# Patient Record
Sex: FEMALE | Race: BLACK OR AFRICAN AMERICAN | Employment: FULL TIME | URBAN - METROPOLITAN AREA
[De-identification: names, ages, dates, MRNs, and addresses within clinical notes are randomized per-mention and may not be internally consistent; named-entity substitution may affect disease eponyms.]

---

## 2018-03-20 ENCOUNTER — HOSPITAL ENCOUNTER (EMERGENCY)
Age: 23
Discharge: HOME | End: 2018-03-21
Attending: EMERGENCY MEDICINE | Admitting: EMERGENCY MEDICINE
Payer: COMMERCIAL

## 2018-03-20 VITALS
HEART RATE: 66 BPM | RESPIRATION RATE: 20 BRPM | OXYGEN SATURATION: 100 % | TEMPERATURE: 97.6 F | SYSTOLIC BLOOD PRESSURE: 137 MMHG | DIASTOLIC BLOOD PRESSURE: 88 MMHG

## 2018-03-20 DIAGNOSIS — S80.811A LEG ABRASION, RIGHT, INITIAL ENCOUNTER: ICD-10-CM

## 2018-03-20 DIAGNOSIS — S80.02XA CONTUSION OF LEFT KNEE, INITIAL ENCOUNTER: ICD-10-CM
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
## 2018-03-20 DIAGNOSIS — S16.1XXA STRAIN OF NECK MUSCLE, INITIAL ENCOUNTER: ICD-10-CM

## 2018-03-20 DIAGNOSIS — S39.012A STRAIN OF LUMBAR REGION, INITIAL ENCOUNTER: ICD-10-CM

## 2018-03-20 PROCEDURE — 99284 EMERGENCY DEPT VISIT MOD MDM: CPT

## 2018-03-20 RX ORDER — OXYCODONE AND ACETAMINOPHEN 5; 325 MG/1; MG/1
1 TABLET ORAL
Status: COMPLETED | OUTPATIENT
Start: 2018-03-20 | End: 2018-03-21

## 2018-03-20 RX ORDER — CYCLOBENZAPRINE HCL 10 MG
10 TABLET ORAL
Status: COMPLETED | OUTPATIENT
Start: 2018-03-20 | End: 2018-03-21

## 2018-03-21 ENCOUNTER — APPOINTMENT (OUTPATIENT)
Dept: GENERAL RADIOLOGY | Age: 23
End: 2018-03-21
Attending: PHYSICIAN ASSISTANT
Payer: COMMERCIAL

## 2018-03-21 LAB — HCG UR QL: NEGATIVE

## 2018-03-21 PROCEDURE — 72100 X-RAY EXAM L-S SPINE 2/3 VWS: CPT

## 2018-03-21 PROCEDURE — 72040 X-RAY EXAM NECK SPINE 2-3 VW: CPT

## 2018-03-21 PROCEDURE — 74011250637 HC RX REV CODE- 250/637: Performed by: PHYSICIAN ASSISTANT

## 2018-03-21 PROCEDURE — 73564 X-RAY EXAM KNEE 4 OR MORE: CPT

## 2018-03-21 PROCEDURE — 81025 URINE PREGNANCY TEST: CPT

## 2018-03-21 RX ADMIN — CYCLOBENZAPRINE HYDROCHLORIDE 10 MG: 10 TABLET, FILM COATED ORAL at 00:01

## 2018-03-21 RX ADMIN — OXYCODONE HYDROCHLORIDE AND ACETAMINOPHEN 1 TABLET: 5; 325 TABLET ORAL at 00:01
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
## 2018-03-21 NOTE — ED NOTES
The documentation for this period is being entered following the guidelines as defined in the [facility] policy by Steve Yu RN.
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
## 2018-03-27 ENCOUNTER — OFFICE VISIT (OUTPATIENT)
Dept: INTERNAL MEDICINE CLINIC | Age: 23
End: 2018-03-27

## 2018-03-27 VITALS
SYSTOLIC BLOOD PRESSURE: 120 MMHG | BODY MASS INDEX: 47.09 KG/M2 | OXYGEN SATURATION: 100 % | WEIGHT: 293 LBS | RESPIRATION RATE: 16 BRPM | DIASTOLIC BLOOD PRESSURE: 77 MMHG | HEART RATE: 59 BPM | TEMPERATURE: 97 F | HEIGHT: 66 IN
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
## 2018-03-27 DIAGNOSIS — N92.6 MISSED PERIOD: ICD-10-CM

## 2018-03-27 DIAGNOSIS — N92.6 MENSTRUAL PERIOD LATE: ICD-10-CM

## 2018-03-27 DIAGNOSIS — M54.9 OTHER ACUTE BACK PAIN: ICD-10-CM

## 2018-03-27 PROBLEM — E66.01 OBESITY, MORBID (HCC): Status: ACTIVE | Noted: 2018-03-27

## 2018-03-27 RX ORDER — OXYCODONE AND ACETAMINOPHEN 5; 325 MG/1; MG/1
TABLET ORAL
COMMUNITY

## 2018-03-27 RX ORDER — MELOXICAM 15 MG/1
15 TABLET ORAL DAILY
Qty: 20 TAB | Refills: 0 | Status: SHIPPED | OUTPATIENT
Start: 2018-03-27

## 2018-03-27 RX ORDER — CYCLOBENZAPRINE HCL 10 MG
TABLET ORAL
COMMUNITY
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
## 2018-03-27 NOTE — PATIENT INSTRUCTIONS

## 2018-03-28 ENCOUNTER — HOSPITAL ENCOUNTER (OUTPATIENT)
Dept: PHYSICAL THERAPY | Age: 23
Discharge: HOME | End: 2018-03-28
Payer: COMMERCIAL

## 2018-03-28 PROCEDURE — 97161 PT EVAL LOW COMPLEX 20 MIN: CPT

## 2018-03-28 PROCEDURE — 97014 ELECTRIC STIMULATION THERAPY: CPT

## 2018-03-28 PROCEDURE — 97110 THERAPEUTIC EXERCISES: CPT
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
## 2018-04-02 ENCOUNTER — HOSPITAL ENCOUNTER (OUTPATIENT)
Dept: PHYSICAL THERAPY | Age: 23
Discharge: HOME | End: 2018-04-02
Payer: COMMERCIAL

## 2018-04-02 PROCEDURE — 97014 ELECTRIC STIMULATION THERAPY: CPT

## 2018-04-02 PROCEDURE — 97140 MANUAL THERAPY 1/> REGIONS: CPT

## 2018-04-02 PROCEDURE — 97110 THERAPEUTIC EXERCISES: CPT

## 2018-04-02 NOTE — PROGRESS NOTES
PT DAILY TREATMENT NOTE     Patient Name: Paulie Hanson  Date:2018  : 1995  [x]  Patient  Verified  Payor: Arlin Wood / Plan: [place] [employer]IMA  CAPITATED PT / Product Type: Commerical /    In time: 10:55 am           Out time: 11:42 am  Total Treatment Time (min): 52  Visit #: 2 of     Treatment Area: Acute back pain [M54.9]    SUBJECTIVE  Pain Level (0-10 scale): 7 in L/S, 4 in (L) knee  Any medication changes, allergies to medications, adverse drug reactions, diagnosis change, or new procedure performed?: [x] No    [] Yes (see summary sheet for update)  Subjective functional status/changes:   [] No changes reported  \"I have been feeling a bit better since I have been doing the exercises before I go to bed daily. I am still working, but lights things though. \"     OBJECTIVE  Modality rationale: decrease inflammation, decrease pain and increase tissue extensibility to improve the patients ability to tolerate prolonged amb   Min Type Additional Details   10 [x] Estim: []Att   [x]Unatt        []TENS instruct                  [x]IFC  []Premod   []NMES                     []Other:  []w/US   []w/ice   [x]w/heat  Position: semi-reclined  Location: L/S    []  Traction: [] Cervical       []Lumbar                       [] Prone          []Supine                       []Intermittent   []Continuous Lbs:  [] before manual  [] after manual    []  Ultrasound: []Continuous   [] Pulsed                           []1MHz   []3MHz Location:  W/cm2:    []  Iontophoresis with dexamethasone         Location: [] Take home patch   [] In clinic    []  Ice     []  heat  []  Ice massage Position:  Location:    []  Vasopneumatic Device Pressure:       [] lo [] med [] hi   Temperature: [] lo [] med [] hi   [x] Skin assessment post-treatment:  [x]intact []redness- no adverse reaction       []redness - adverse reaction:     29 min Therapeutic Exercise:  [x] See flow sheet: initiated therex per IE   Rationale: increase ROM, increase strength and improve coordination to improve the patients ability to perform work duties     8 min Manual Therapy:  prone rolling to (B) LPS and gluts   Rationale: decrease pain, increase ROM, increase tissue extensibility and decrease trigger points to tolerate prolonged sitting, standing, and walking as necessitated by work duties          X min Patient Education: [x] Review HEP from IE     Other Objective/Functional Measures:    STG met. Good PPT - appropriate lower core awareness. Pain Level (0-10 scale) post treatment: 5 in L/S    ASSESSMENT/Changes in Function:   Good tolerance to treatment today with patient req 100% verbal/tactile cueing and demo for proper form/technique with all newly introduced therex; pt able to complete all therex without adverse reaction. Patient will continue to benefit from skilled PT services to modify and progress therapeutic interventions, address functional mobility deficits, address ROM deficits, address strength deficits, analyze and address soft tissue restrictions, analyze and cue movement patterns, analyze and modify body mechanics/ergonomics and assess and modify postural abnormalities to attain remaining goals. [x]  See Plan of Care  []  See progress note/recertification  []  See Discharge Summary         Progress towards goals / Updated goals:  Short Term Goals: To be accomplished in  1  weeks:  1. Pt will be independent and compliant with HEP. -Goal met; patient notes compliance 1x daily at night (4/2/18)  Long Term Goals: To be accomplished in  8-12  treatments:  1. Patient will increase FOTO score to 74/100 for indications of increased functional mobility. 2.  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 Patient will demo LS flexion to fingertips to ankle for ease with bending to ADLs and work duties   4.   Patient will report 50% improvement in sleep tolerance for progression to increased QOL and to PLOF     PLAN  [x]  Upgrade activities as tolerated     [x]  Continue plan of care  []  Update interventions per flow sheet       []  Discharge due to:_  [x]  Other: assess response to tx    Rangel Lee, PTA 4/2/2018

## 2018-04-04 ENCOUNTER — HOSPITAL ENCOUNTER (OUTPATIENT)
Dept: PHYSICAL THERAPY | Age: 23
Discharge: HOME | End: 2018-04-04
Payer: COMMERCIAL

## 2018-04-04 PROCEDURE — 97110 THERAPEUTIC EXERCISES: CPT

## 2018-04-04 PROCEDURE — 97140 MANUAL THERAPY 1/> REGIONS: CPT

## 2018-04-04 PROCEDURE — 97014 ELECTRIC STIMULATION THERAPY: CPT

## 2018-04-04 NOTE — PROGRESS NOTES
PT DAILY TREATMENT NOTE     Patient Name: Greg Jacobs  Date:2018  : 1995  [x]  Patient  Verified  Payor: Mary López / Plan: [place] [employer]IMA  [facility] Type: Commer[facility] /    In time: 11:32 am           Out time: 12:38 pm  Total Treatment Time (min): 66  Visit #: 3 of     Treatment Area: Acute back pain [M54.9]    SUBJECTIVE  Pain Level (0-10 scale): 7 in L/S, 4 in knee  Any medication changes, allergies to medications, adverse drug reactions, diagnosis change, or new procedure performed?: [x] No    [] Yes (see summary sheet for update)  Subjective functional status/changes:   [] No changes reported  \"I worked 8 hours today. \"    OBJECTIVE  Modality rationale: decrease inflammation, decrease pain and increase tissue extensibility to improve the patients ability to tolerate prolonged amb   Min Type Additional Details   10 [x] Estim: []Att   [x]Unatt        []TENS instruct                  [x]IFC  []Premod   []NMES                     []Other:  []w/US   []w/ice   [x]w/heat  Position: semi-reclined with LE elevated on wedge  Location: L/S    []  Traction: [] Cervical       []Lumbar                       [] Prone          []Supine                       []Intermittent   []Continuous Lbs:  [] before manual  [] after manual    []  Ultrasound: []Continuous   [] Pulsed                           []1MHz   []3MHz Location:  W/cm2:    []  Iontophoresis with dexamethasone         Location: [] Take home patch   [] In clinic    []  Ice     []  heat  []  Ice massage Position:  Location:    []  Vasopneumatic Device Pressure:       [] lo [] med [] hi   Temperature: [] lo [] med [] hi   [x] Skin assessment post-treatment:  [x]intact []redness- no adverse reaction       []redness - adverse reaction:     48 min Therapeutic Exercise:  [x] See flow sheet: added SB LTR and cat/camels   Rationale: increase ROM, increase strength and improve coordination to improve the patients ability to perform work duties     8 min Manual Therapy:  prone rolling to (B) LPS and gluts; TPR to LPS   Rationale: decrease pain, increase ROM, increase tissue extensibility and decrease trigger points to tolerate prolonged sitting, standing, and walking as necessitated by work duties          X min Patient Education: [x] Review HEP     Other Objective/Functional Measures:     Pain Level (0-10 scale) post treatment: 6 in L/S, 4 in knee    ASSESSMENT/Changes in Function:   Significant posterior chain tissue tightness addressed well with manual interventions. Patient demos good PPT/TA draw, but challenged with appendicular loading. Patient will continue to benefit from skilled PT services to modify and progress therapeutic interventions, address functional mobility deficits, address ROM deficits, address strength deficits, analyze and address soft tissue restrictions, analyze and cue movement patterns, analyze and modify body mechanics/ergonomics and assess and modify postural abnormalities to attain remaining goals. [x]  See Plan of Care  []  See progress note/recertification  []  See Discharge Summary         Progress towards goals / Updated goals:  Short Term Goals: To be accomplished in  1  weeks:  1. Pt will be independent and compliant with HEP. -Goal met; patient notes compliance 1x daily at night (4/2/18)  Long Term Goals: To be accomplished in  8-12  treatments:  1. Patient will increase FOTO score to 74/100 for indications of increased functional mobility. 2.  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 Patient will demo LS flexion to fingertips to ankle for ease with bending to ADLs and work duties   4.   Patient will report 50% improvement in sleep tolerance for progression to increased QOL and to PLOF     PLAN  [x]  Upgrade activities as tolerated     [x]  Continue plan of care  []  Update interventions per flow sheet       []  Discharge due to:_  [] Other:_    Marry Sosa, PTA 4/4/2018

## 2018-04-05 ENCOUNTER — OFFICE VISIT (OUTPATIENT)
Dept: INTERNAL MEDICINE CLINIC | Age: 23
End: 2018-04-05

## 2018-04-05 VITALS
OXYGEN SATURATION: 99 % | RESPIRATION RATE: 16 BRPM | HEIGHT: 66 IN | SYSTOLIC BLOOD PRESSURE: 117 MMHG | WEIGHT: 293 LBS | TEMPERATURE: 96.8 F | DIASTOLIC BLOOD PRESSURE: 80 MMHG | BODY MASS INDEX: 47.09 KG/M2 | HEART RATE: 60 BPM

## 2018-04-05 DIAGNOSIS — M54.9 OTHER ACUTE BACK PAIN: Primary | ICD-10-CM

## 2018-04-05 NOTE — MR AVS SNAPSHOT
Mindy Reilly 
 
 
 [address] [facility] [address] [address] 
[phone] Patient: Hiram Szymanski MRN: [id number] :1995 Visit Information Date & Time Provider Department Dept. Phone Encounter #  
 2018  9:15 AM Antonio Lee NP [employer] [phone] [id number] Follow-up Instructions Return if symptoms worsen or fail to improve. Upcoming Health Maintenance Date Due  
 PAP AKA CERVICAL CYTOLOGY 2015 DTaP/Tdap/Td series (2 - Td) 12/15/2016 Influenza Age 5 to Adult 2017 Allergies as of 2018  Review Complete On: 2018 By: Jt Ibarra No Known Allergies Current Immunizations  Reviewed on 3/26/2018 Name Date HPV 2010, 2009, 2009 Hep B Vaccine 1995, 1995, 1995 MMR 2000, 1995 Tdap 12/15/2006 Not reviewed this visit Vitals BP Pulse Temp Resp Height(growth percentile) Weight(growth percentile) 117/80 (BP 1 Location: Right arm, BP Patient Position: Sitting) 60 96.8 °F (36 °C) (Oral) 16 5' 6\" (1.676 m) 297 lb (134.7 kg) SpO2 BMI Smoking Status 99% 47.94 kg/m2 Never Smoker Vitals History BMI and BSA Data Body Mass Index Body Surface Area  
 47.94 kg/m 2 2.5 m 2 Preferred Pharmacy Pharmacy Name Phone [id number], [address] [place] [address] Course [phone] Your Updated Medication List  
  
   
This list is accurate as of 18 10:04 AM.  Always use your most recent med list.  
  
  
  
  
 cyclobenzaprine 10 mg tablet Commonly known as:  FLEXERIL Take  by mouth three (3) times daily as needed for Muscle Spasm(s). meloxicam 15 mg tablet Commonly known as:  MOBIC Take 1 Tab by mouth daily. PERCOCET 5-325 mg per tablet Generic drug:  oxyCODONE-acetaminophen Take  by mouth every four (4) hours as needed for Pain. Follow-up Instructions Return if symptoms worsen or fail to improve. To-Do List   
 04/09/2018 11:00 AM  
  Appointment with Reg Healy, PT at [facility] [address] [place] ([phone]) 04/11/2018 11:30 AM  
  Appointment with Kane Crane, PT at [facility] [address] [place] ([phone]) 04/16/2018 11:00 AM  
  Appointment with Reg Healy, PT at [facility] [place] ([phone]) 04/18/2018 12:00 PM  
  Appointment with [facility] [address] at [facility] [place] ([phone])  
  
 04/23/2018 12:00 PM  
  Appointment with [address] [facility] 1 at [facility] [place] ([phone])  
  
 04/25/2018 12:30 PM  
  Appointment with [facility] [place] [facility] ([phone]) Patient Instructions Back Stretches: Exercises Your Care Instructions Here are some examples of exercises for stretching your back. Start each exercise slowly. Ease off the exercise if you start to have pain. Your doctor or physical therapist will tell you when you can start these exercises and which ones will work best for you. How to do the exercises Overhead stretch 1. Stand comfortably with your feet shoulder-width apart. 2. Looking straight ahead, raise both arms over your head and reach toward the ceiling. Do not allow your head to tilt back. 3. Hold for 15 to 30 seconds, then lower your arms to your sides. 4. Repeat 2 to 4 times. Side stretch 1. Stand comfortably with your feet shoulder-width apart. 2. Raise one arm over your head, and then lean to the other side. 3. Slide your hand down your leg as you let the weight of your arm gently stretch your side muscles. Hold for 15 to 30 seconds. 4. Repeat 2 to 4 times on each side. Press-up 1. Lie on your stomach, supporting your body with your forearms. 2. Press your elbows down into the floor to raise your upper back.  As you do this, relax your stomach muscles and allow your back to arch without using your back muscles. As your press up, do not let your hips or pelvis come off the floor. 3. Hold for 15 to 30 seconds, then relax. 4. Repeat 2 to 4 times. Relax and rest 
 
1. Lie on your back with a rolled towel under your neck and a pillow under your knees. Extend your arms comfortably to your sides. 2. Relax and breathe normally. 3. Remain in this position for about 10 minutes. 4. If you can, do this 2 or 3 times each day. Follow-up care is a key part of your treatment and safety. Be sure to make and go to all appointments, and call your doctor if you are having problems. It's also a good idea to know your test results and keep a list of the medicines you take. Where can you learn more? Go to http://dot-florence.info/. Enter O014 in the search box to learn more about \"Back Stretches: Exercises. \" Current as of: March 21, 2017 Content Version: 11.4 © 2760-5328 [employer]. Care instructions adapted under license by [employer] (which disclaims liability or warranty for this information). If you have questions about a medical condition or this instruction, always ask your healthcare professional. [place] [address] any warranty or liability for your use of this information. Introducing [facility]! Davey Greene introduces [employer] patient portal. Now you can access parts of your medical record, email your doctor's office, and request medication refills online. 1. In your internet browser, go to [email or web address] 2. Click on the First Time User? Click Here link in the Sign In box. You will see the New Member Sign Up page. 3. Enter your [employer] Access Code exactly as it appears below. You will not need to use this code after youve completed the sign-up process. If you do not sign up before the expiration date, you must request a new code.  
 
· [employer] Access Code: [email or web address] 
 Expires: 6/25/2018  3:37 PM 
 
4. Enter the last four digits of your Social Security Number (xxxx) and Date of Birth (mm/dd/yyyy) as indicated and click Submit. You will be taken to the next sign-up page. 5. Create a [employer] ID. This will be your [employer] login ID and cannot be changed, so think of one that is secure and easy to remember. 6. Create a [employer] password. You can change your password at any time. 7. Enter your Password Reset Question and Answer. This can be used at a later time if you forget your password. 8. Enter your e-mail address. You will receive e-mail notification when new information is available in [address] 9. Click Sign Up. You can now view and download portions of your medical record. 10. Click the Download Summary menu link to download a portable copy of your medical information. If you have questions, please visit the Frequently Asked Questions section of the [employer] website. Remember, [employer] is NOT to be used for urgent needs. For medical emergencies, dial 911. Now available from your iPhone and Android! Please provide this summary of care documentation to your next provider. Your primary care clinician is listed as LOENA JUAREZ. If you have any questions after today's visit, please call [phone].

## 2018-04-05 NOTE — PROGRESS NOTES
HISTORY OF PRESENT ILLNESS  Carlie Anaya is a 20 y.o. female. Patient presents for lower back follow up. Rates pain as 5/10. Patient has had three sessions of PT, states pain is gradually improving. Patient denies any dysuria, urinary frequency, hematuria, difficulty with ambulation,defecation, numbness,tingling,pain radiation to lower extremity. LOW BACK PAIN   The history is provided by the patient. This is a new problem. The current episode started more than 1 week ago. The problem occurs constantly. The problem has been gradually improving. Pertinent negatives include no chest pain, no abdominal pain, no headaches and no shortness of breath. Review of Systems   Constitutional: Negative. HENT: Negative. Eyes: Negative. Respiratory: Negative. Negative for shortness of breath. Cardiovascular: Negative. Negative for chest pain. Gastrointestinal: Negative. Negative for abdominal pain. Genitourinary: Negative. Musculoskeletal: Positive for back pain. Good ROM to back, good lateral movements,able to bend over,tenderness to lower back, no evidence of deformity,swelling,gait steady. Skin: Negative. Neurological: Negative. Negative for headaches. Psychiatric/Behavioral: Negative. Physical Exam   Constitutional: She is oriented to person, place, and time. She appears well-developed and well-nourished. /80 (BP 1 Location: Right arm, BP Patient Position: Sitting)  Pulse 60  Temp 96.8 °F (36 °C) (Oral)   Resp 16  Ht 5' 6\" (1.676 m)  Wt 297 lb (134.7 kg)  SpO2 99%  BMI 47.94 kg/m2     HENT:   Head: Normocephalic and atraumatic. Eyes: Conjunctivae and EOM are normal. Pupils are equal, round, and reactive to light. Neck: Normal range of motion. Cardiovascular: Normal rate. Pulmonary/Chest: Effort normal and breath sounds normal.   Musculoskeletal: Normal range of motion. Lumbar back: She exhibits tenderness.    Neurological: She is alert and oriented to person, place, and time. GCS eye subscore is 4. GCS verbal subscore is 5. GCS motor subscore is 6. Skin: Skin is warm and dry. Psychiatric: She has a normal mood and affect. Her speech is normal and behavior is normal. Judgment and thought content normal. Cognition and memory are normal.   Vitals reviewed. ASSESSMENT and PLAN    ICD-10-CM ICD-9-CM    1. Other acute back pain M54.9 724.5      Encounter Diagnoses   Name Primary?  Other acute back pain Yes     No orders of the defined types were placed in this encounter. No orders of the defined types were placed in this encounter. No orders of the defined types were placed in this encounter. Diagnoses and all orders for this visit:    1. Other acute back pain      Follow-up Disposition:  Return if symptoms worsen or fail to improve.   current treatment plan is effective, no change in therapy  the following changes in treatment are made: Patient still has NSAID and muscle relaxant, Continue current Rx, reassess after PT.

## 2018-04-05 NOTE — LETTER
NOTIFICATION RETURN TO WORK / SCHOOL 
 
4/5/2018 10:01 AM 
 
Ms. Joao Finch [address] [place] [id number] To Whom It May Concern: 
 
Joao Finch is currently under the care of Cherry Bangura. She will return to work/school on: No push,pull or lift more than 10 lbs until revaluated on the 4/12/2018 If there are questions or concerns please have the patient contact our office. Sincerely, Ana Paula Mars NP

## 2018-04-05 NOTE — PROGRESS NOTES
ROOM # 3    Bill Gonzalez presents today for   Chief Complaint   Patient presents with    LOW BACK PAIN       Bill Gonzalez preferred language for health care discussion is english/other. Is someone accompanying this pt? no    Is the patient using any DME equipment during OV? no    Depression Screening:  PHQ over the last two weeks 3/27/2018   Little interest or pleasure in doing things Not at all   Feeling down, depressed or hopeless Not at all   Total Score PHQ 2 0       Learning Assessment:  Learning Assessment 3/27/2018   PRIMARY LEARNER Patient   HIGHEST LEVEL OF EDUCATION - PRIMARY LEARNER  SOME COLLEGE   BARRIERS PRIMARY LEARNER NONE   CO-LEARNER CAREGIVER No   PRIMARY LANGUAGE ENGLISH   LEARNER PREFERENCE PRIMARY READING   ANSWERED BY patient    RELATIONSHIP SELF       Abuse Screening:  n/i    Fall Risk  n/i    Health Maintenance reviewed and discussed per provider. Yes    Bill Gonzalez is due for nothing at this time. Please order/place referral if appropriate. Advance Directive:  1. Do you have an advance directive in place? Patient Reply: no    2. If not, would you like material regarding how to put one in place? Patient Reply: no    Coordination of Care:  1. Have you been to the ER, urgent care clinic since your last visit? Hospitalized since your last visit? no    2. Have you seen or consulted any other health care providers outside of the [facility] since your last visit? Include any pap smears or colon screening.  no

## 2018-04-05 NOTE — PATIENT INSTRUCTIONS

## 2018-04-05 NOTE — LETTER
NOTIFICATION RETURN TO WORK / SCHOOL 
 
4/5/2018 9:58 AM 
 
Ms. Delilah Hobbs [address] [place] [phone] To Whom It May Concern: 
 
Delilah Hobbs is currently under the care of Cherry Banguar. She may be excused from work until she reevaluated on the 4/12/2018 If there are questions or concerns please have the patient contact our office. Sincerely, Darwin Ortega NP

## 2018-04-05 NOTE — PROGRESS NOTES
Patient presents for lower back follow up. Rates pain as 5/10. Patient has had three sessions of PT, states pain is gradually improving. Patient denies any dysuria, urinary frequency, hematuria, difficulty with ambulation,defecation, numbness,tingling,pain radiation to lower extremity.

## 2018-04-09 ENCOUNTER — HOSPITAL ENCOUNTER (OUTPATIENT)
Dept: PHYSICAL THERAPY | Age: 23
Discharge: HOME | End: 2018-04-09
Payer: COMMERCIAL

## 2018-04-09 PROCEDURE — 97014 ELECTRIC STIMULATION THERAPY: CPT

## 2018-04-09 PROCEDURE — 97140 MANUAL THERAPY 1/> REGIONS: CPT

## 2018-04-09 PROCEDURE — 97110 THERAPEUTIC EXERCISES: CPT

## 2018-04-09 NOTE — PROGRESS NOTES
PT DAILY TREATMENT NOTE     Patient Name: Joao Finch  Date:2018  : 1995  [x]  Patient  Verified  Payor: Mohit Olmos / Plan: [place] [employer]  CAPITATED PT / Product Type: Commerical /    In time: 11:03 am           Out time: 12:17 pm  Total Treatment Time (min): 74  Visit #: 4 of     Treatment Area: Acute back pain [M54.9]    SUBJECTIVE  Pain Level (0-10 scale): 4-5 in L/S  Any medication changes, allergies to medications, adverse drug reactions, diagnosis change, or new procedure performed?: [] No    [x] Yes (see summary sheet for update)  Subjective functional status/changes:   [] No changes reported  [facility] doctor gave me Meloxicam and it has been helping. 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 \"    OBJECTIVE  Modality rationale: decrease inflammation, decrease pain and increase tissue extensibility to improve the patients ability to tolerate prolonged amb   Min Type Additional Details   10 [x] Estim: []Att   [x]Unatt        []TENS instruct                  [x]IFC  []Premod   []NMES                     []Other:  []w/US   []w/ice   [x]w/heat  Position: semi-reclined with LE elevated on wedge  Location: L/S    []  Traction: [] Cervical       []Lumbar                       [] Prone          []Supine                       []Intermittent   []Continuous Lbs:  [] before manual  [] after manual    []  Ultrasound: []Continuous   [] Pulsed                           []1MHz   []3MHz Location:  W/cm2:    []  Iontophoresis with dexamethasone         Location: [] Take home patch   [] In clinic    []  Ice     []  heat  []  Ice massage Position:  Location:    []  Vasopneumatic Device Pressure:       [] lo [] med [] hi   Temperature: [] lo [] med [] hi   [x] Skin assessment post-treatment:  [x]intact []redness- no adverse reaction       []redness - adverse reaction:     55 min Therapeutic Exercise:  [x] See flow sheet: added SB flexion stretch 3-way and bridges   Rationale: increase ROM, increase strength and improve coordination to improve the patients ability to perform work duties     9 min Manual Therapy: prone rolling to (B) LPS and gluts; TPR to LPS   Rationale: decrease pain, increase ROM, increase tissue extensibility and decrease trigger points to tolerate prolonged sitting, standing, and walking as necessitated by work duties          X min Patient Education: [x] Review HEP - added LS flexion stretch     Other Objective/Functional Measures:    Core strength: 100% bridge with fatigue   Sleep interruptions: nightly    Pain Level (0-10 scale) post treatment: 3 in L/S    ASSESSMENT/Changes in Function:   Good tolerance to treatment today. Patient demos good form/mechanics with crate lift, but req VCs for TA draw to maintain neutral spine; c/o tightness in the low back with full squat position to lift crate. Patient will continue to benefit from skilled PT services to modify and progress therapeutic interventions, address functional mobility deficits, address ROM deficits, address strength deficits, analyze and address soft tissue restrictions, analyze and cue movement patterns, analyze and modify body mechanics/ergonomics and assess and modify postural abnormalities to attain remaining goals. [x]  See Plan of Care  []  See progress note/recertification  []  See Discharge Summary         Progress towards goals / Updated goals:  Short Term Goals: To be accomplished in  1  weeks:  1. Pt will be independent and compliant with HEP. -Goal met; patient notes compliance 1x daily at night (4/2/18)  Long Term Goals: To be accomplished in  8-12  treatments:  1. Patient will increase FOTO score to 74/100 for indications of increased functional mobility. 2.  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 -Goal met; pt demos 100% bridge (4/9/18)  3. Patient will demo LS flexion to fingertips to ankle for ease with bending to ADLs and work duties.   4.  Patient will report 50% improvement in sleep tolerance for progression to increased QOL and to PLOF.      PLAN  [x]  Upgrade activities as tolerated     [x]  Continue plan of care  []  Update interventions per flow sheet       []  Discharge due to:_  []  Other:_    Marry Sosa, PTA 4/9/2018

## 2018-04-11 ENCOUNTER — APPOINTMENT (OUTPATIENT)
Dept: PHYSICAL THERAPY | Age: 23
End: 2018-04-11
Payer: COMMERCIAL

## 2018-04-12 ENCOUNTER — OFFICE VISIT (OUTPATIENT)
Dept: INTERNAL MEDICINE CLINIC | Age: 23
End: 2018-04-12

## 2018-04-12 VITALS
SYSTOLIC BLOOD PRESSURE: 132 MMHG | WEIGHT: 293 LBS | OXYGEN SATURATION: 99 % | HEIGHT: 66 IN | BODY MASS INDEX: 47.09 KG/M2 | TEMPERATURE: 97.1 F | DIASTOLIC BLOOD PRESSURE: 72 MMHG | HEART RATE: 73 BPM | RESPIRATION RATE: 16 BRPM

## 2018-04-12 DIAGNOSIS — S39.92XS INJURY OF LOW BACK, SEQUELA: ICD-10-CM
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
## 2018-04-12 NOTE — PROGRESS NOTES
Patient presents for a a lower back pain f/u, rates pain as 4/10. Patient has been going to PT, verbalizes improving with PT. Patient denies any numbness,tingling, pain radiation to lower extremity, dysuria, urinary frequency, abd pain.

## 2018-04-12 NOTE — PATIENT INSTRUCTIONS

## 2018-04-12 NOTE — LETTER
NOTIFICATION RETURN TO WORK / SCHOOL 
 
4/12/2018 9:26 AM 
 
Ms. Reinhold Sandhoff [address] [place] [phone] To Whom It May Concern: 
 
Reinhold Sandhoff is currently under the care of Cherry Bangura. She will return to work/school on: 4/12/2018 with full and unrestricted duty. If there are questions or concerns please have the patient contact our office. Sincerely, Jeet Mckeon NP

## 2018-04-12 NOTE — PROGRESS NOTES
ROOM # 8    My Mendez presents today for   Chief Complaint   Patient presents with    LOW BACK PAIN     f/u        My Mendez preferred language for health care discussion is english/other. Is someone accompanying this pt? no    Is the patient using any DME equipment during OV? no    Depression Screening:  PHQ over the last two weeks 3/27/2018   Little interest or pleasure in doing things Not at all   Feeling down, depressed or hopeless Not at all   Total Score PHQ 2 0       Learning Assessment:  Learning Assessment 3/27/2018   PRIMARY LEARNER Patient   HIGHEST LEVEL OF EDUCATION - PRIMARY LEARNER  SOME COLLEGE   BARRIERS PRIMARY LEARNER NONE   CO-LEARNER CAREGIVER No   PRIMARY LANGUAGE ENGLISH   LEARNER PREFERENCE PRIMARY READING   ANSWERED BY patient    RELATIONSHIP SELF       Abuse Screening:  n/i    Fall Risk  n/i    Health Maintenance reviewed and discussed per provider. Yes    My Mendez is due for nothing at this time. Please order/place referral if appropriate. Advance Directive:  1. Do you have an advance directive in place? Patient Reply: no    2. If not, would you like material regarding how to put one in place? Patient Reply: no    Coordination of Care:  1. Have you been to the ER, urgent care clinic since your last visit? Hospitalized since your last visit? no    2. Have you seen or consulted any other health care providers outside of the [address] [place] [address] since your last visit? Include any pap smears or colon screening.  no

## 2018-04-13 ENCOUNTER — HOSPITAL ENCOUNTER (OUTPATIENT)
Dept: PHYSICAL THERAPY | Age: 23
Discharge: HOME | End: 2018-04-13
Payer: COMMERCIAL

## 2018-04-13 PROCEDURE — 97110 THERAPEUTIC EXERCISES: CPT

## 2018-04-13 NOTE — PROGRESS NOTES
PT DAILY TREATMENT NOTE     Patient Name: Erik Espinoza  Date:2018  : 1995  [x]  Patient  Verified  Payor: Pacheco Bacon / Plan: [place] [employer]  CAPITATED PT / Product Type: Commerical /    In time: 2:03 pm           Out time: 2:45 pm  Total Treatment Time (min): 42  Visit #: 5 of      Treatment Area: Acute back pain [M54.9]    SUBJECTIVE  Pain Level (0-10 scale): 3 in L/S  Any medication changes, allergies to medications, adverse drug reactions, diagnosis change, or new procedure performed?: [] No    [x] Yes (see summary sheet for update)  Subjective functional status/changes:   [] No changes reported  \"I have been walking on the treadmill at my work's gym. I feel pretty good today. \"    OBJECTIVE  Modality rationale: decrease inflammation, decrease pain and increase tissue extensibility to improve the patients ability to tolerate prolonged amb   Min Type Additional Details   PD [x] Estim: []Att   [x]Unatt        []TENS instruct                  [x]IFC  []Premod   []NMES                     []Other:  []w/US   []w/ice   [x]w/heat  Position: semi-reclined with LE elevated on wedge  Location: L/S    []  Traction: [] Cervical       []Lumbar                       [] Prone          []Supine                       []Intermittent   []Continuous Lbs:  [] before manual  [] after manual    []  Ultrasound: []Continuous   [] Pulsed                           []1MHz   []3MHz Location:  W/cm2:    []  Iontophoresis with dexamethasone         Location: [] Take home patch   [] In clinic    []  Ice     []  heat  []  Ice massage Position:  Location:    []  Vasopneumatic Device Pressure:       [] lo [] med [] hi   Temperature: [] lo [] med [] hi   [x] Skin assessment post-treatment:  [x]intact []redness- no adverse reaction       []redness - adverse reaction:     42 min Therapeutic Exercise:  [x] See flow sheet: add TB monster walks, TB sidestepping, TB chops bilaterally and minisquat   Rationale: increase ROM, increase strength and improve coordination to improve the patients ability to perform work duties     0 min Manual Therapy: NI   Rationale: decrease pain, increase ROM, increase tissue extensibility and decrease trigger points to tolerate prolonged sitting, standing, and walking as necessitated by work duties          X min Patient Education: [x] Review HEP - added resisted monster walks and sidestepping (TB provided)     Other Objective/Functional Measures:    LS flexion AROM: fingertips to floor    Pain Level (0-10 scale) post treatment: 3 in L/S    ASSESSMENT/Changes in Function:   Good tolerance to progression of therex today with pt req only min VCs for TA draw and PPT to avoid LS substitutions; pt notes ability to perform all therex without adverse reaction. Good squat mechanics. Held manual and IFC as PD due to improved sx today. Patient will continue to benefit from skilled PT services to modify and progress therapeutic interventions, address functional mobility deficits, address ROM deficits, address strength deficits, analyze and address soft tissue restrictions, analyze and cue movement patterns, analyze and modify body mechanics/ergonomics and assess and modify postural abnormalities to attain remaining goals. [x]  See Plan of Care  []  See progress note/recertification  []  See Discharge Summary         Progress towards goals / Updated goals:  Short Term Goals: To be accomplished in  1  weeks:  1. Pt will be independent and compliant with HEP. -Goal met; patient notes compliance 1x daily at night (4/2/18)  Long Term Goals: To be accomplished in  8-12  treatments:  1. Patient will increase FOTO score to 74/100 for indications of increased functional mobility. -Goal progressing; pt has returned to work gym for TM walking to improve cardiovascular endurance (4/13/18)  2. 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  -Goal met; pt demos 100% bridge (4/9/18)  3. Patient will demo LS flexion to fingertips to ankle for ease with bending to ADLs and work duties. -Goal met; pt demos AROM LS flexion fingertips to floor (4/13/18)  4. Patient will report 50% improvement in sleep tolerance for progression to increased QOL and to PLOF.      PLAN  [x]  Upgrade activities as tolerated     [x]  Continue plan of care  []  Update interventions per flow sheet       []  Discharge due to:_  [x]  Other: assess response to progressed therex    Leslie Antunez, PTA 4/13/2018

## 2018-04-16 ENCOUNTER — HOSPITAL ENCOUNTER (OUTPATIENT)
Dept: PHYSICAL THERAPY | Age: 23
Discharge: HOME | End: 2018-04-16
Payer: COMMERCIAL

## 2018-04-16 PROCEDURE — 97140 MANUAL THERAPY 1/> REGIONS: CPT

## 2018-04-16 PROCEDURE — 97014 ELECTRIC STIMULATION THERAPY: CPT

## 2018-04-16 PROCEDURE — 97110 THERAPEUTIC EXERCISES: CPT

## 2018-04-16 NOTE — PROGRESS NOTES
PT DAILY TREATMENT NOTE     Patient Name: Bill Gonzalez  Date:2018  : 1995  [x]  Patient  Verified  Payor: Fabiola Clinton / Plan: [place] [employer]  CAPITATED PT / Product Type: Commerical /    In time: 11:02 am         Out time: 12:12 pm  Total Treatment Time (min): 70  Visit #: 6 of      Treatment Area: Acute back pain [M54.9]    SUBJECTIVE  Pain Level (0-10 scale): 6 in L/S, 0 in knee  Any medication changes, allergies to medications, adverse drug reactions, diagnosis change, or new procedure performed?: [] No    [x] Yes (see summary sheet for update)  Subjective functional status/changes:   [] No changes reported  \"Just a little tight today. I was doing a lot of lifting at work the other day. \"    OBJECTIVE  Modality rationale: decrease inflammation, decrease pain and increase tissue extensibility to improve the patients ability to tolerate prolonged amb   Min Type Additional Details   10 [x] Estim: []Att   [x]Unatt        []TENS instruct                  [x]IFC  []Premod   []NMES                     []Other:  []w/US   []w/ice   [x]w/heat  Position: semi-reclined with LE elevated on wedge  Location: L/S    []  Traction: [] Cervical       []Lumbar                       [] Prone          []Supine                       []Intermittent   []Continuous Lbs:  [] before manual  [] after manual    []  Ultrasound: []Continuous   [] Pulsed                           []1MHz   []3MHz Location:  W/cm2:    []  Iontophoresis with dexamethasone         Location: [] Take home patch   [] In clinic    []  Ice     []  heat  []  Ice massage Position:  Location:    []  Vasopneumatic Device Pressure:       [] lo [] med [] hi   Temperature: [] lo [] med [] hi   [x] Skin assessment post-treatment:  [x]intact []redness- no adverse reaction       []redness - adverse reaction:     51 min Therapeutic Exercise:  [x] See flow sheet: added open books   Rationale: increase ROM, increase strength and improve coordination to improve the patients ability to perform work duties     9 min Manual Therapy: prone stick rolling to (B) LPS and QL, TPS   Rationale: decrease pain, increase ROM, increase tissue extensibility and decrease trigger points to tolerate prolonged sitting, standing, and walking as necessitated by work duties          X min Patient Education: [x] Review HEP     Other Objective/Functional Measures:       Pain Level (0-10 scale) post treatment: 5    ASSESSMENT/Changes in Function:   Good tolerance to progression of therex today. Patient demos significant reduction in T/S extension with > chunk of extension from the L3-5 junction. Patient able to complete all therex without visible distress. Patient will continue to benefit from skilled PT services to modify and progress therapeutic interventions, address functional mobility deficits, address ROM deficits, address strength deficits, analyze and address soft tissue restrictions, analyze and cue movement patterns, analyze and modify body mechanics/ergonomics and assess and modify postural abnormalities to attain remaining goals. [x]  See Plan of Care  []  See progress note/recertification  []  See Discharge Summary         Progress towards goals / Updated goals:  Short Term Goals: To be accomplished in  1  weeks:  1. Pt will be independent and compliant with HEP. -Goal met; patient notes compliance 1x daily at night (4/2/18)  Long Term Goals: To be accomplished in  8-12  treatments:  1. Patient will increase FOTO score to 74/100 for indications of increased functional mobility. -Goal progressing; pt has returned to work gym for TM walking to improve cardiovascular endurance (4/13/18)  2. 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 -Goal met; pt demos 100% bridge (4/9/18)  3. Patient will demo LS flexion to fingertips to ankle for ease with bending to ADLs and work duties.  -Goal met; pt demos AROM LS flexion fingertips to floor (4/13/18)  4. Patient will report 50% improvement in sleep tolerance for progression to increased QOL and to PLOF.      PLAN  [x]  Upgrade activities as tolerated     [x]  Continue plan of care  []  Update interventions per flow sheet       []  Discharge due to:_  [x]  Other: assess response to progressed therex    Bryanna Phillips, FRIDA 4/16/2018

## 2018-04-18 ENCOUNTER — HOSPITAL ENCOUNTER (OUTPATIENT)
Dept: PHYSICAL THERAPY | Age: 23
Discharge: HOME | End: 2018-04-18
Payer: COMMERCIAL

## 2018-04-18 PROCEDURE — 97140 MANUAL THERAPY 1/> REGIONS: CPT

## 2018-04-18 PROCEDURE — 97530 THERAPEUTIC ACTIVITIES: CPT

## 2018-04-18 PROCEDURE — 97110 THERAPEUTIC EXERCISES: CPT

## 2018-04-18 PROCEDURE — 97014 ELECTRIC STIMULATION THERAPY: CPT

## 2018-04-18 NOTE — PROGRESS NOTES
PT DAILY TREATMENT NOTE     Patient Name: Kinsey Cesar  Date:2018  : 1995  [x]  Patient  Verified  Payor: Teresa Gabriel / Plan: [place] [employer]  CAPITATED PT / Product Type: Commerical /    In time:          Out time: 103  Total Treatment Time (min): 60  Visit #: 7 of      Treatment Area: Acute back pain [M54.9]    SUBJECTIVE  Pain Level (0-10 scale): 6 in L/S, 0 in knee  Any medication changes, allergies to medications, adverse drug reactions, diagnosis change, or new procedure performed?: [] No    [x] Yes (see summary sheet for update)  Subjective functional status/changes:   [] No changes reported  \"I joined the gym and have been walking on the TM and am happy about that. I do not have pain when walking on the TM on a 2.0 incline. \"    OBJECTIVE  Modality rationale: decrease inflammation, decrease pain and increase tissue extensibility to improve the patients ability to tolerate prolonged amb   Min Type Additional Details   10 [x] Estim: []Att   [x]Unatt        []TENS instruct                  [x]IFC  []Premod   []NMES                     []Other:  []w/US   []w/ice   [x]w/heat  Position: prone over pillow  Location: L/S    []  Traction: [] Cervical       []Lumbar                       [] Prone          []Supine                       []Intermittent   []Continuous Lbs:  [] before manual  [] after manual    []  Ultrasound: []Continuous   [] Pulsed                           []1MHz   []3MHz Location:  W/cm2:    []  Iontophoresis with dexamethasone         Location: [] Take home patch   [] In clinic    []  Ice     []  heat  []  Ice massage Position:  Location:    []  Vasopneumatic Device Pressure:       [] lo [] med [] hi   Temperature: [] lo [] med [] hi   [x] Skin assessment post-treatment:  [x]intact []redness- no adverse reaction       []redness - adverse reaction:     30 min Therapeutic Exercise:  [x] See flow sheet:   Rationale: increase ROM, increase strength and improve coordination to improve the patients ability to perform work duties     10 min Manual Therapy: prone stick rolling to bilateral lumbar paraspinals; PA glides thoracic spine grade III and IV to tolerance   Rationale: decrease pain, increase ROM, increase tissue extensibility and decrease trigger points to tolerate prolonged sitting, standing, and walking as necessitated by work duties    10min theracts: education in lifting mechanics and body 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 Discontinued third, secondary to c/o LBP and increased lumbar lordosis. Pt able to complete pot stirs and donkey kicks over swiss ball without pain with VCs for neutral spine. +Bilateral Han test and pt would continue to benefit from iliopsoas stretching and TPR to such. Stressed importance of using gait belt at work to lift pts and proper body mechanics in addition to recommending margret lift. Patient will continue to benefit from skilled PT services to modify and progress therapeutic interventions, address functional mobility deficits, address ROM deficits, address strength deficits, analyze and address soft tissue restrictions, analyze and cue movement patterns, analyze and modify body mechanics/ergonomics and assess and modify postural abnormalities to attain remaining goals. [x]  See Plan of Care  []  See progress note/recertification  []  See Discharge Summary         Progress towards goals / Updated goals:  Short Term Goals: To be accomplished in  1  weeks:  1. Pt will be independent and compliant with HEP.     -Goal met; patient notes compliance 1x daily at night (4/2/18)  Long Term Goals: To be accomplished in  8-12  treatments:  1. Patient will increase FOTO score to 74/100 for indications of increased functional mobility. -Goal progressing; pt has returned to work gym for TM walking to improve cardiovascular endurance (4/13/18)  2. 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 -Goal met; pt demos 100% bridge (4/9/18)  3. Patient will demo LS flexion to fingertips to ankle for ease with bending to ADLs and work duties. -Goal met; pt demos AROM LS flexion fingertips to floor (4/13/18)  4. Patient will report 50% improvement in sleep tolerance for progression to increased QOL and to PLOF.  -Progressing-instructed pt to sleep sidelying with pillow between the knees 4/18/18    PLAN  [x]  Upgrade activities as tolerated     [x]  Continue plan of care  []  Update interventions per flow sheet       []  Discharge due to:_  [x]  Other: assess response to progressed nickie Anthony, PT 4/18/2018

## 2018-04-23 ENCOUNTER — HOSPITAL ENCOUNTER (OUTPATIENT)
Dept: PHYSICAL THERAPY | Age: 23
Discharge: HOME | End: 2018-04-23
Payer: COMMERCIAL

## 2018-04-23 PROCEDURE — 97110 THERAPEUTIC EXERCISES: CPT

## 2018-04-23 PROCEDURE — 97140 MANUAL THERAPY 1/> REGIONS: CPT

## 2018-04-23 PROCEDURE — 97014 ELECTRIC STIMULATION THERAPY: CPT

## 2018-04-23 NOTE — PROGRESS NOTES
PT DAILY TREATMENT NOTE     Patient Name: Ivelisse Call  Date:2018  : 1995  [x]  Patient  Verified  Payor: Kane Backer / [place] [place] [employer]  [facility] Product Type: Commerical /    In time: 5746         Out time: 1310  Total Treatment Time (min): 63  Visit #: 8 of      Treatment Area: Acute back pain [M54.9]    SUBJECTIVE  Pain Level (0-10 scale): 4-5  Any medication changes, allergies to medications, adverse drug reactions, diagnosis change, or new procedure performed?: [] No    [x] Yes (see summary sheet for update)  Subjective functional status/changes:   [] No changes reported  \"I was a little sore after last time, but it was a good thing. I still can't sleep at night because I can't find a comfortable position. My back pain just depends on how much lifting I have to do at work\".     OBJECTIVE  Modality rationale: decrease inflammation, decrease pain and increase tissue extensibility to improve the patients ability to tolerate prolonged amb   Min Type Additional Details   10 [x] Estim: []Att   [x]Unatt        []TENS instruct                  []IFC  [x]Premod   []NMES                     []Other:  []w/US   []w/ice   [x]w/heat  Position: prone over pillow  Location: lower t/s and l/s    []  Traction: [] Cervical       []Lumbar                       [] Prone          []Supine                       []Intermittent   []Continuous Lbs:  [] before manual  [] after manual    []  Ultrasound: []Continuous   [] Pulsed                           []1MHz   []3MHz Location:  W/cm2:    []  Iontophoresis with dexamethasone         Location: [] Take home patch   [] In clinic    []  Ice     []  heat  []  Ice massage Position:  Location:    []  Vasopneumatic Device Pressure:       [] lo [] med [] hi   Temperature: [] lo [] med [] hi   [x] Skin assessment post-treatment:  [x]intact []redness- no adverse reaction       []redness - adverse reaction:     40 min Therapeutic Exercise:  [x] See flow sheet: (-5 min Nustep warm up)   Rationale: increase ROM, increase strength and improve coordination to improve the patients ability to perform work duties     8 min Manual Therapy: Gentle STM to thoraco-lumbar paraspinals. BETH cuellar thoracic spine grade III and IV to tolerance   Rationale: decrease pain, increase ROM, increase tissue extensibility and decrease trigger points to tolerate prolonged sitting, standing, and walking as necessitated by work duties              X min Patient Education: [x] Review HEP     Other Objective/Functional Measures:   -poor tolerance for attempted DTM to b/l l/s paraspinals; addressed significant hypertonicity to thoraco-lumbar paraspinals with estim and MHP    FOTO= 61 (+13 point improvement from [facility])    Pain Level (0-10 scale) post treatment: 4    ASSESSMENT/Changes in Function:  Pt demonstrates good progress over this initial course of PT, however has not yet met all LTG's. Plan for formal assessment at next visit with likely recommendation for skilled progression to attain LTG's. Pt with fair core strength demonstrated today as per need to perform planks and quadruped exercises in modified positions. Patient will continue to benefit from skilled PT services to modify and progress therapeutic interventions, address functional mobility deficits, address ROM deficits, address strength deficits, analyze and address soft tissue restrictions, analyze and cue movement patterns, analyze and modify body mechanics/ergonomics and assess and modify postural abnormalities to attain remaining goals. [x]  See Plan of Care  []  See progress note/recertification  []  See Discharge Summary         Progress towards goals / Updated goals:     Short Term Goals: To be accomplished in  1  weeks:  1. Pt will be independent and compliant with HEP. -Goal met; patient notes compliance 1x daily at night (4/2/18)  Long Term Goals: To be accomplished in  8-12  treatments:  1.   Patient will increase FOTO score to 74/100 for indications of increased functional mobility. -Goal progressing; 61/100 (4/23/18)  2. 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 -Goal met; pt demos 100% bridge (4/9/18)  3. Patient will demo LS flexion to fingertips to ankle for ease with bending to ADLs and work duties. -Goal met; pt demos AROM LS flexion fingertips to floor (4/13/18)  4. Patient will report 50% improvement in sleep tolerance for progression to increased QOL and to PLOF. -Pt reports 40% improvement since [facility] 4/23/18    PLAN  [x]  Upgrade activities as tolerated     [x]  Continue plan of care  []  Update interventions per flow sheet       []  Discharge due to:_  []  Other:     Celine Amaya, PT 4/23/2018

## 2018-04-25 ENCOUNTER — HOSPITAL ENCOUNTER (OUTPATIENT)
Dept: PHYSICAL THERAPY | Age: 23
Discharge: HOME | End: 2018-04-25
Payer: COMMERCIAL

## 2018-04-25 PROCEDURE — 97140 MANUAL THERAPY 1/> REGIONS: CPT

## 2018-04-25 PROCEDURE — 97110 THERAPEUTIC EXERCISES: CPT

## 2018-04-25 NOTE — PROGRESS NOTES
[address] [facility] [address] [facility] AT [address], [place], [place]  Phone: [phone] Fax [phone] SUMMARY FOR PHYSICAL THERAPY  Patient Name: Paulie Hanson : 1995   Treatment Diagnosis: Acute back pain [M54.9] Onset Date: Acute back pain   Referral Source: Dimitry Fowler NP [facility]): 3/28/18   Prior Hospitalization: See medical hx Provider #: [id number]   Prior Level of Function: independent   Comorbidities: obesity   [facility] from [facility]: 9 Missed Visits: 0       Goal/Measure of Progress Goal Met? Status at Last Eval/Current Status:  · Short Term Goals: To be accomplished in  1  weeks:  1. Pt will be independent and compliant with HEP-MET  · Long Term Goals: To be accomplished in  8-12  treatments:  1. Patient will increase FOTO score to 74/100 for indications of increased functional mobility. -NOT MET 61%  2. Patient will demo 100% bridge for progression of core strength with lifting patients for work -MET  3. Patient will demo LS flexion to fingertips to ankle for ease with bending to ADLs and work duties -MET  4. Patient will report 50% improvement in sleep tolerance for progression to increased QOL and to PLOF-MET    Key Functional Changes/Progress:   Pt demonstrates improved body mechanics with lifting techniques required for her job; improved postural awareness; independent in HEP; notes 75% in improvement in symptoms  She continues to have LBP when sleeping but was educated in logroll technique and importance of body     Assessments/Recommendations: Discontinue therapy. Progressing towards or have reached established goals. Specifics:     Summary of Care: Thank you for referring this pleasant patient. Savage Clark has completed 9 of 8-12 proposed sessions and has met or exceeded most of her long term goals.  She reports 75% improvement of symptoms with ADLs   Foto Score on evaluation was 48 and on discharge is 61 - indicating overall improvement. Patient is ready to assume I management of home exercise program to assist with keeping pain level down and decrease change of reoccurence of initial symptoms. Will advise patient that is any additional follow up or recommendation is needed to return to referring physician. Reason for Discharge:  [] The patient was non-compliant with attendance. [] The patient could not be contacted to schedule further therapy sessions. [] The patient has been discharged based on the orders of the referring physician.  [] The patient has requested to be discharged due to:   [x] Patient has met all goals or max benefit has been achieved    G-Codes (GP):     If you have any questions/comments please contact us directly at [phone]. Thank you for allowing us to assist in the care of your patient.     Therapist Signature: Gerardo Mcguire PT Date: 6/48/32   Certification Period:    Reporting Period: MEDICARE ONLY        Time: 1:59 PM

## 2018-04-25 NOTE — PROGRESS NOTES
PT DAILY TREATMENT NOTE     Patient Name: Ivelisse Call  Date:2018  : 1995  [x]  Patient  Verified  Payor: Kane Backer / Plan: [place] [employer]  [facility] / Product Type: Commerical /    In time:1234  Out time:145  Total Treatment Time (min): 71  Total Timed Codes (min): 56  1:1 Treatment Time MC (min):    Visit #: 9 of     Treatment Area: Acute back pain [M54.9]    SUBJECTIVE  Pain Level (0-10 scale): 4/10  Any medication changes, allergies to medications, adverse drug reactions, diagnosis change, or new procedure performed?: [x] No    [] Yes (see summary sheet for update)  Subjective functional status/changes:   [] No changes reported  Pt requests DC as she is independent in her HEP and notes 75% improvement in symptoms. OBJECTIVE  Modality rationale:  The below therapeutic interventions were performed and/or provided to: decrease pain   Min Type Additional Details   15 [x] Estim: []Att   [x]Unatt        []TENS instruct                  [x]IFC  []Premod   []NMES                     []Other:  []w/US   []w/ice   [x]w/heat  Position: prone over pillow  Location: L-S    []  Traction: [] Cervical       []Lumbar                       [] Prone          []Supine                       []Intermittent   []Continuous Lbs:  [] before manual  [] after manual    []  Ultrasound: []Continuous   [] Pulsed                           []1MHz   []3MHz Location:  W/cm2:    []  Iontophoresis with dexamethasone         Location: [] Take home patch   [] In clinic    []  Ice     []  heat  []  Ice massage Position:  Location:    []  Vasopneumatic Device Pressure:       [] lo [] med [] hi   Temperature: [] lo [] med [] hi   [] Skin assessment post-treatment:  []intact []redness- no adverse reaction       []redness - adverse reaction:       48 min Therapeutic Exercise:  [] See flow sheet :   Rationale: increase strength     min Therapeutic Activity:  []  See flow sheet :   Rationale:       min Neuromuscular Re-education:  []  See flow sheet :   Rationale:        8 min Manual Therapy:  IASTM to L-S paraspinals in prone over pillow Patient is a candidate for cupping and without contraindications at this time. Patient was instructed in the indications/contraindications of cupping technique. The patient was educated in its purpose and risks/benefits. Patient was advised that redness is normal after technique is performed and that redness will disappear typically within one week. Dynamic cupping technique performed for 5min to bilateral L-S paraspinals. Patient with verbalized understanding of all and without any adverse reaction after treatment. Rationale: decrease pain and increase tissue extensibility      min Gait Training:  ___ feet with ___ device on level surfaces with ___ level of assist   Rationale:           min Patient Education: [x] Review HEP    [] Progressed/Changed HEP based on:   [] positioning   [] body mechanics   [] transfers   [] heat/ice application          Other Objective/Functional Measures:   See DC    Pain Level (0-10 scale) post treatment: 2/10    ASSESSMENT/Changes in Function:   Pt independent in HEP and with improved postural awareness. She is independent in lifting techniques and body mechanics. Requests DC at this time. []  See Plan of Care  []  See progress note/recertification  [x]  See Discharge Summary         Progress towards goals / Updated goals:    Status at Last Eval/Current Status:  · Short Term Goals: To be accomplished in  1  weeks:  1. Pt will be independent and compliant with HEP-MET  · Long Term Goals: To be accomplished in  8-12  treatments:  1. Patient will increase FOTO score to 74/100 for indications of increased functional mobility. -NOT MET 61%  2. Patient will demo 100% bridge for progression of core strength with lifting patients for work -MET  3. Patient will demo LS flexion to fingertips to ankle for ease with bending to ADLs and work duties -MET  4.  Patient will report 50% improvement in sleep tolerance for progression to increased QOL and to PLOF-MET    PLAN  []  Upgrade activities as tolerated     []  Continue plan of care  []  Update interventions per flow sheet       [x]  Discharge due to:_goals met  []  Other:_      Kaleb Schultz, PT 4/25/2018  2:14 PM